# Patient Record
Sex: FEMALE | Race: WHITE | NOT HISPANIC OR LATINO | Employment: UNEMPLOYED | URBAN - METROPOLITAN AREA
[De-identification: names, ages, dates, MRNs, and addresses within clinical notes are randomized per-mention and may not be internally consistent; named-entity substitution may affect disease eponyms.]

---

## 2017-04-29 ENCOUNTER — HISTORICAL (OUTPATIENT)
Dept: LAB | Facility: HOSPITAL | Age: 72
End: 2017-04-29

## 2017-10-26 ENCOUNTER — HISTORICAL (OUTPATIENT)
Dept: LAB | Facility: HOSPITAL | Age: 72
End: 2017-10-26

## 2017-10-26 LAB
ALBUMIN SERPL-MCNC: 4 GM/DL (ref 3.4–5)
ALBUMIN/GLOB SERPL: 0.9 RATIO (ref 1.1–2)
ALP SERPL-CCNC: 59 UNIT/L (ref 46–116)
ALT SERPL-CCNC: 26 UNIT/L (ref 12–78)
AST SERPL-CCNC: 20 UNIT/L (ref 15–37)
BILIRUB SERPL-MCNC: 0.5 MG/DL (ref 0.2–1)
BILIRUBIN DIRECT+TOT PNL SERPL-MCNC: 0.16 MG/DL (ref 0–0.2)
BILIRUBIN DIRECT+TOT PNL SERPL-MCNC: 0.3 MG/DL (ref 0–0.8)
BUN SERPL-MCNC: 20.7 MG/DL (ref 7–18)
CALCIUM SERPL-MCNC: 9.4 MG/DL (ref 8.5–10.1)
CHLORIDE SERPL-SCNC: 105 MMOL/L (ref 98–107)
CHOLEST SERPL-MCNC: 173 MG/DL (ref 0–200)
CHOLEST/HDLC SERPL: 2.6 {RATIO} (ref 0–4)
CO2 SERPL-SCNC: 29.6 MMOL/L (ref 21–32)
CREAT SERPL-MCNC: 0.71 MG/DL (ref 0.6–1.3)
GLOBULIN SER-MCNC: 4.7 GM/DL (ref 2.4–3.5)
GLUCOSE SERPL-MCNC: 108 MG/DL (ref 74–106)
HDLC SERPL-MCNC: 66 MG/DL (ref 40–60)
LDLC SERPL CALC-MCNC: 94 MG/DL (ref 0–129)
POTASSIUM SERPL-SCNC: 4.6 MMOL/L (ref 3.5–5.1)
PROT SERPL-MCNC: 8.7 GM/DL (ref 6.4–8.2)
SODIUM SERPL-SCNC: 140 MMOL/L (ref 136–145)
TRIGL SERPL-MCNC: 66 MG/DL
VLDLC SERPL CALC-MCNC: 13 MG/DL

## 2017-11-08 ENCOUNTER — HISTORICAL (OUTPATIENT)
Dept: RADIOLOGY | Facility: HOSPITAL | Age: 72
End: 2017-11-08

## 2018-05-12 ENCOUNTER — HISTORICAL (OUTPATIENT)
Dept: LAB | Facility: HOSPITAL | Age: 73
End: 2018-05-12

## 2018-05-12 LAB
ALBUMIN SERPL-MCNC: 3.8 GM/DL (ref 3.4–5)
ALBUMIN/GLOB SERPL: 0.8 RATIO (ref 1.1–2)
ALP SERPL-CCNC: 57 UNIT/L (ref 46–116)
ALT SERPL-CCNC: 23 UNIT/L (ref 12–78)
AST SERPL-CCNC: 24 UNIT/L (ref 15–37)
BILIRUB SERPL-MCNC: 0.4 MG/DL (ref 0.2–1)
BILIRUBIN DIRECT+TOT PNL SERPL-MCNC: 0.13 MG/DL (ref 0–0.2)
BILIRUBIN DIRECT+TOT PNL SERPL-MCNC: 0.27 MG/DL (ref 0–0.8)
BUN SERPL-MCNC: 18.6 MG/DL (ref 7–18)
CALCIUM SERPL-MCNC: 9 MG/DL (ref 8.5–10.1)
CHLORIDE SERPL-SCNC: 104 MMOL/L (ref 98–107)
CHOLEST SERPL-MCNC: 172 MG/DL (ref 0–200)
CHOLEST/HDLC SERPL: 2.8 {RATIO} (ref 0–4)
CO2 SERPL-SCNC: 31.9 MMOL/L (ref 21–32)
CREAT SERPL-MCNC: 0.73 MG/DL (ref 0.6–1.3)
GLOBULIN SER-MCNC: 4.9 GM/DL (ref 2.4–3.5)
GLUCOSE SERPL-MCNC: 117 MG/DL (ref 74–106)
HDLC SERPL-MCNC: 62 MG/DL (ref 40–60)
LDLC SERPL CALC-MCNC: 96 MG/DL (ref 0–129)
POTASSIUM SERPL-SCNC: 4.4 MMOL/L (ref 3.5–5.1)
PROT SERPL-MCNC: 8.7 GM/DL (ref 6.4–8.2)
SODIUM SERPL-SCNC: 140 MMOL/L (ref 136–145)
TRIGL SERPL-MCNC: 71 MG/DL
VLDLC SERPL CALC-MCNC: 14 MG/DL

## 2018-05-28 ENCOUNTER — HISTORICAL (OUTPATIENT)
Dept: RADIOLOGY | Facility: HOSPITAL | Age: 73
End: 2018-05-28

## 2018-10-12 ENCOUNTER — HISTORICAL (OUTPATIENT)
Dept: ADMINISTRATIVE | Facility: HOSPITAL | Age: 73
End: 2018-10-12

## 2018-10-12 LAB — POC CREATININE: 0.6 MG/DL (ref 0.6–1.3)

## 2018-10-26 ENCOUNTER — HISTORICAL (OUTPATIENT)
Dept: RADIOLOGY | Facility: HOSPITAL | Age: 73
End: 2018-10-26

## 2019-04-06 ENCOUNTER — HISTORICAL (OUTPATIENT)
Dept: LAB | Facility: HOSPITAL | Age: 74
End: 2019-04-06

## 2019-04-06 LAB
ABS NEUT (OLG): 2.02 X10(3)/MCL (ref 2.1–9.2)
ALBUMIN SERPL-MCNC: 3.8 GM/DL (ref 3.4–5)
ALBUMIN/GLOB SERPL: 0.7 RATIO (ref 1.1–2)
ALP SERPL-CCNC: 57 UNIT/L (ref 46–116)
ALT SERPL-CCNC: 16 UNIT/L (ref 12–78)
AST SERPL-CCNC: 22 UNIT/L (ref 15–37)
BASOPHILS # BLD AUTO: 0 X10(3)/MCL (ref 0–0.2)
BASOPHILS NFR BLD AUTO: 1 %
BILIRUB SERPL-MCNC: 0.5 MG/DL (ref 0.2–1)
BILIRUBIN DIRECT+TOT PNL SERPL-MCNC: 0.1 MG/DL (ref 0–0.2)
BILIRUBIN DIRECT+TOT PNL SERPL-MCNC: 0.4 MG/DL (ref 0–0.8)
BUN SERPL-MCNC: 17.9 MG/DL (ref 7–18)
CALCIUM SERPL-MCNC: 9.6 MG/DL (ref 8.5–10.1)
CHLORIDE SERPL-SCNC: 104 MMOL/L (ref 98–107)
CHOLEST SERPL-MCNC: 180 MG/DL (ref 0–200)
CHOLEST/HDLC SERPL: 2.6 {RATIO} (ref 0–4)
CO2 SERPL-SCNC: 30.3 MMOL/L (ref 21–32)
CREAT SERPL-MCNC: 0.74 MG/DL (ref 0.6–1.3)
EOSINOPHIL # BLD AUTO: 0.1 X10(3)/MCL (ref 0–0.9)
EOSINOPHIL NFR BLD AUTO: 3 %
ERYTHROCYTE [DISTWIDTH] IN BLOOD BY AUTOMATED COUNT: 14.5 % (ref 11.5–17)
GLOBULIN SER-MCNC: 5.4 GM/DL (ref 2.4–3.5)
GLUCOSE SERPL-MCNC: 90 MG/DL (ref 74–106)
HCT VFR BLD AUTO: 40.8 % (ref 37–47)
HDLC SERPL-MCNC: 69 MG/DL (ref 40–60)
HGB BLD-MCNC: 13.7 GM/DL (ref 12–16)
LDLC SERPL CALC-MCNC: 99 MG/DL (ref 0–129)
LYMPHOCYTES # BLD AUTO: 2.2 X10(3)/MCL (ref 0.6–4.6)
LYMPHOCYTES NFR BLD AUTO: 46 %
MCH RBC QN AUTO: 30.7 PG (ref 27–31)
MCHC RBC AUTO-ENTMCNC: 33.6 GM/DL (ref 33–36)
MCV RBC AUTO: 91.5 FL (ref 80–94)
MONOCYTES # BLD AUTO: 0.3 X10(3)/MCL (ref 0.1–1.3)
MONOCYTES NFR BLD AUTO: 7 %
NEUTROPHILS # BLD AUTO: 2.02 X10(3)/MCL (ref 1.4–7.9)
NEUTROPHILS NFR BLD AUTO: 43 %
PLATELET # BLD AUTO: 172 X10(3)/MCL (ref 130–400)
PMV BLD AUTO: 12.5 FL (ref 9.4–12.4)
POTASSIUM SERPL-SCNC: 4.5 MMOL/L (ref 3.5–5.1)
PROT SERPL-MCNC: 9.2 GM/DL (ref 6.4–8.2)
RBC # BLD AUTO: 4.46 X10(6)/MCL (ref 4.2–5.4)
SODIUM SERPL-SCNC: 140 MMOL/L (ref 136–145)
TRIGL SERPL-MCNC: 61 MG/DL
VLDLC SERPL CALC-MCNC: 12 MG/DL
WBC # SPEC AUTO: 4.7 X10(3)/MCL (ref 4.5–11.5)

## 2019-08-01 ENCOUNTER — HISTORICAL (OUTPATIENT)
Dept: LAB | Facility: HOSPITAL | Age: 74
End: 2019-08-01

## 2019-08-01 LAB
BUN SERPL-MCNC: 18.3 MG/DL (ref 7–18)
CALCIUM SERPL-MCNC: 9.1 MG/DL (ref 8.5–10.1)
CHLORIDE SERPL-SCNC: 105 MMOL/L (ref 98–107)
CO2 SERPL-SCNC: 24.9 MMOL/L (ref 21–32)
CREAT SERPL-MCNC: 0.78 MG/DL (ref 0.6–1.3)
CREAT/UREA NIT SERPL: 23
GLUCOSE SERPL-MCNC: 84 MG/DL (ref 74–106)
POTASSIUM SERPL-SCNC: 3.8 MMOL/L (ref 3.5–5.1)
SODIUM SERPL-SCNC: 139 MMOL/L (ref 136–145)

## 2019-08-13 ENCOUNTER — HISTORICAL (OUTPATIENT)
Dept: RADIOLOGY | Facility: HOSPITAL | Age: 74
End: 2019-08-13

## 2019-10-29 ENCOUNTER — HISTORICAL (OUTPATIENT)
Dept: RADIOLOGY | Facility: HOSPITAL | Age: 74
End: 2019-10-29

## 2020-10-26 ENCOUNTER — HISTORICAL (OUTPATIENT)
Dept: LAB | Facility: HOSPITAL | Age: 75
End: 2020-10-26

## 2020-10-26 LAB
ALBUMIN SERPL-MCNC: 4.26 GM/DL (ref 3.4–5)
ALP SERPL-CCNC: 60 UNIT/L (ref 46–116)
ALT SERPL-CCNC: 18 UNIT/L (ref 12–78)
AST SERPL-CCNC: 22 UNIT/L (ref 15–37)
BILIRUB SERPL-MCNC: 0.4 MG/DL (ref 0.2–1)
BILIRUBIN DIRECT+TOT PNL SERPL-MCNC: 0.14 MG/DL (ref 0–0.2)
BILIRUBIN DIRECT+TOT PNL SERPL-MCNC: 0.26 MG/DL (ref 0–0.8)
CHOLEST SERPL-MCNC: 187 MG/DL (ref 0–200)
CHOLEST/HDLC SERPL: 2.6 {RATIO} (ref 0–4)
HDLC SERPL-MCNC: 73 MG/DL (ref 40–60)
LDLC SERPL CALC-MCNC: 99 MG/DL (ref 0–129)
PROT SERPL-MCNC: 9.6 GM/DL (ref 6.4–8.2)
TRIGL SERPL-MCNC: 73 MG/DL
VLDLC SERPL CALC-MCNC: 15 MG/DL

## 2020-12-14 ENCOUNTER — HISTORICAL (OUTPATIENT)
Dept: RADIOLOGY | Facility: HOSPITAL | Age: 75
End: 2020-12-14

## 2021-05-31 ENCOUNTER — HISTORICAL (OUTPATIENT)
Dept: LAB | Facility: HOSPITAL | Age: 76
End: 2021-05-31

## 2021-05-31 LAB
ABS NEUT (OLG): 2.43 X10(3)/MCL (ref 2.1–9.2)
ALBUMIN SERPL-MCNC: 3.9 GM/DL (ref 3.4–4.8)
ALBUMIN/GLOB SERPL: 0.8 RATIO (ref 1.1–2)
ALP SERPL-CCNC: 62 UNIT/L (ref 40–150)
ALT SERPL-CCNC: 12 UNIT/L (ref 0–55)
AST SERPL-CCNC: 18 UNIT/L (ref 5–34)
BASOPHILS # BLD AUTO: 0 X10(3)/MCL (ref 0–0.2)
BASOPHILS NFR BLD AUTO: 1 %
BILIRUB SERPL-MCNC: 0.6 MG/DL
BILIRUBIN DIRECT+TOT PNL SERPL-MCNC: 0.3 MG/DL (ref 0–0.5)
BILIRUBIN DIRECT+TOT PNL SERPL-MCNC: 0.3 MG/DL (ref 0–0.8)
BUN SERPL-MCNC: 15.4 MG/DL (ref 9.8–20.1)
CALCIUM SERPL-MCNC: 9.2 MG/DL (ref 8.4–10.2)
CHLORIDE SERPL-SCNC: 105 MMOL/L (ref 98–107)
CHOLEST SERPL-MCNC: 166 MG/DL
CHOLEST/HDLC SERPL: 3 {RATIO} (ref 0–5)
CO2 SERPL-SCNC: 26 MMOL/L (ref 23–31)
CREAT SERPL-MCNC: 0.74 MG/DL (ref 0.55–1.02)
DEPRECATED CALCIDIOL+CALCIFEROL SERPL-MC: 24.4 NG/ML (ref 30–80)
EOSINOPHIL # BLD AUTO: 0.2 X10(3)/MCL (ref 0–0.9)
EOSINOPHIL NFR BLD AUTO: 3 %
ERYTHROCYTE [DISTWIDTH] IN BLOOD BY AUTOMATED COUNT: 12.8 % (ref 11.5–17)
GLOBULIN SER-MCNC: 4.8 GM/DL (ref 2.4–3.5)
GLUCOSE SERPL-MCNC: 109 MG/DL (ref 82–115)
HCT VFR BLD AUTO: 40.3 % (ref 37–47)
HDLC SERPL-MCNC: 50 MG/DL (ref 35–60)
HGB BLD-MCNC: 13.2 GM/DL (ref 12–16)
IMM GRANULOCYTES # BLD AUTO: 0.01 % (ref 0–0.02)
IMM GRANULOCYTES NFR BLD AUTO: 0.2 % (ref 0–0.43)
LDLC SERPL CALC-MCNC: 98 MG/DL (ref 50–140)
LYMPHOCYTES # BLD AUTO: 2 X10(3)/MCL (ref 0.6–4.6)
LYMPHOCYTES NFR BLD AUTO: 40 %
MCH RBC QN AUTO: 32.1 PG (ref 27–31)
MCHC RBC AUTO-ENTMCNC: 32.8 GM/DL (ref 33–36)
MCV RBC AUTO: 98.1 FL (ref 80–94)
MONOCYTES # BLD AUTO: 0.3 X10(3)/MCL (ref 0.1–1.3)
MONOCYTES NFR BLD AUTO: 7 %
NEUTROPHILS # BLD AUTO: 2.43 X10(3)/MCL (ref 1.4–7.9)
NEUTROPHILS NFR BLD AUTO: 49 %
PLATELET # BLD AUTO: 165 X10(3)/MCL (ref 130–400)
PMV BLD AUTO: 11.3 FL (ref 9.4–12.4)
POTASSIUM SERPL-SCNC: 4.4 MMOL/L (ref 3.5–5.1)
PROT SERPL-MCNC: 8.7 GM/DL (ref 5.8–7.6)
RBC # BLD AUTO: 4.11 X10(6)/MCL (ref 4.2–5.4)
SODIUM SERPL-SCNC: 140 MMOL/L (ref 136–145)
TRIGL SERPL-MCNC: 90 MG/DL (ref 37–140)
TSH SERPL-ACNC: 2.25 UIU/ML (ref 0.35–4.94)
VLDLC SERPL CALC-MCNC: 18 MG/DL
WBC # SPEC AUTO: 4.9 X10(3)/MCL (ref 4.5–11.5)

## 2021-12-20 ENCOUNTER — HISTORICAL (OUTPATIENT)
Dept: RADIOLOGY | Facility: HOSPITAL | Age: 76
End: 2021-12-20

## 2021-12-20 LAB
ABS NEUT (OLG): 3.99 X10(3)/MCL (ref 2.1–9.2)
ALBUMIN SERPL-MCNC: 4.1 GM/DL (ref 3.4–4.8)
ALBUMIN/GLOB SERPL: 0.9 RATIO (ref 1.1–2)
ALP SERPL-CCNC: 66 UNIT/L (ref 40–150)
ALT SERPL-CCNC: 16 UNIT/L (ref 0–55)
AST SERPL-CCNC: 22 UNIT/L (ref 5–34)
BASOPHILS # BLD AUTO: 0 X10(3)/MCL (ref 0–0.2)
BASOPHILS NFR BLD AUTO: 0 %
BILIRUB SERPL-MCNC: 0.5 MG/DL
BILIRUBIN DIRECT+TOT PNL SERPL-MCNC: 0.2 MG/DL (ref 0–0.8)
BILIRUBIN DIRECT+TOT PNL SERPL-MCNC: 0.3 MG/DL (ref 0–0.5)
BUN SERPL-MCNC: 15.2 MG/DL (ref 9.8–20.1)
CALCIUM SERPL-MCNC: 9.4 MG/DL (ref 8.7–10.5)
CHLORIDE SERPL-SCNC: 105 MMOL/L (ref 98–107)
CHOLEST SERPL-MCNC: 173 MG/DL
CHOLEST/HDLC SERPL: 3 {RATIO} (ref 0–5)
CO2 SERPL-SCNC: 26 MMOL/L (ref 23–31)
CREAT SERPL-MCNC: 0.74 MG/DL (ref 0.55–1.02)
DEPRECATED CALCIDIOL+CALCIFEROL SERPL-MC: 76 NG/ML (ref 30–80)
EOSINOPHIL # BLD AUTO: 0.1 X10(3)/MCL (ref 0–0.9)
EOSINOPHIL NFR BLD AUTO: 2 %
ERYTHROCYTE [DISTWIDTH] IN BLOOD BY AUTOMATED COUNT: 13.5 % (ref 11.5–17)
GLOBULIN SER-MCNC: 4.8 GM/DL (ref 2.4–3.5)
GLUCOSE SERPL-MCNC: 117 MG/DL (ref 82–115)
HCT VFR BLD AUTO: 39.8 % (ref 37–47)
HDLC SERPL-MCNC: 60 MG/DL (ref 35–60)
HGB BLD-MCNC: 12.5 GM/DL (ref 12–16)
IMM GRANULOCYTES # BLD AUTO: 0.02 % (ref 0–0.02)
IMM GRANULOCYTES NFR BLD AUTO: 0.3 % (ref 0–0.43)
LDLC SERPL CALC-MCNC: 99 MG/DL (ref 50–140)
LYMPHOCYTES # BLD AUTO: 2.3 X10(3)/MCL (ref 0.6–4.6)
LYMPHOCYTES NFR BLD AUTO: 34 %
MCH RBC QN AUTO: 31.4 PG (ref 27–31)
MCHC RBC AUTO-ENTMCNC: 31.4 GM/DL (ref 33–36)
MCV RBC AUTO: 100 FL (ref 80–94)
MONOCYTES # BLD AUTO: 0.4 X10(3)/MCL (ref 0.1–1.3)
MONOCYTES NFR BLD AUTO: 6 %
NEUTROPHILS # BLD AUTO: 3.99 X10(3)/MCL (ref 1.4–7.9)
NEUTROPHILS NFR BLD AUTO: 58 %
PLATELET # BLD AUTO: 210 X10(3)/MCL (ref 130–400)
PMV BLD AUTO: 11.4 FL (ref 9.4–12.4)
POTASSIUM SERPL-SCNC: 4.3 MMOL/L (ref 3.5–5.1)
PROT SERPL-MCNC: 8.9 GM/DL (ref 5.8–7.6)
RBC # BLD AUTO: 3.98 X10(6)/MCL (ref 4.2–5.4)
SODIUM SERPL-SCNC: 139 MMOL/L (ref 136–145)
TRIGL SERPL-MCNC: 71 MG/DL (ref 37–140)
TSH SERPL-ACNC: 1.62 UIU/ML (ref 0.35–4.94)
VLDLC SERPL CALC-MCNC: 14 MG/DL
WBC # SPEC AUTO: 6.9 X10(3)/MCL (ref 4.5–11.5)

## 2022-06-27 ENCOUNTER — HOSPITAL ENCOUNTER (EMERGENCY)
Facility: HOSPITAL | Age: 77
Discharge: HOME | End: 2022-06-27
Attending: SPECIALIST
Payer: MEDICARE

## 2022-06-27 VITALS
OXYGEN SATURATION: 99 % | SYSTOLIC BLOOD PRESSURE: 155 MMHG | RESPIRATION RATE: 18 BRPM | HEART RATE: 66 BPM | DIASTOLIC BLOOD PRESSURE: 90 MMHG

## 2022-06-27 DIAGNOSIS — R10.13 EPIGASTRIC PAIN: ICD-10-CM

## 2022-06-27 DIAGNOSIS — R11.2 NAUSEA AND VOMITING, INTRACTABILITY OF VOMITING NOT SPECIFIED, UNSPECIFIED VOMITING TYPE: Primary | ICD-10-CM

## 2022-06-27 LAB
ALBUMIN SERPL-MCNC: 4.6 GM/DL (ref 3.4–4.8)
ALBUMIN/GLOB SERPL: 0.8 RATIO (ref 1.1–2)
ALP SERPL-CCNC: 65 UNIT/L (ref 40–150)
ALT SERPL-CCNC: 18 UNIT/L (ref 0–55)
AST SERPL-CCNC: 25 UNIT/L (ref 5–34)
BASOPHILS # BLD AUTO: 0.01 X10(3)/MCL (ref 0–0.2)
BASOPHILS NFR BLD AUTO: 0.2 %
BILIRUBIN DIRECT+TOT PNL SERPL-MCNC: 0.7 MG/DL
BUN SERPL-MCNC: 19.8 MG/DL (ref 9.8–20.1)
CALCIUM SERPL-MCNC: 10.2 MG/DL (ref 8.4–10.2)
CHLORIDE SERPL-SCNC: 103 MMOL/L (ref 98–107)
CO2 SERPL-SCNC: 25 MMOL/L (ref 23–31)
CREAT SERPL-MCNC: 1.03 MG/DL (ref 0.55–1.02)
EOSINOPHIL # BLD AUTO: 0 X10(3)/MCL (ref 0–0.9)
EOSINOPHIL NFR BLD AUTO: 0 %
ERYTHROCYTE [DISTWIDTH] IN BLOOD BY AUTOMATED COUNT: 13.7 % (ref 11.5–17)
GLOBULIN SER-MCNC: 6.1 GM/DL (ref 2.4–3.5)
GLUCOSE SERPL-MCNC: 132 MG/DL (ref 82–115)
HCT VFR BLD AUTO: 41.6 % (ref 37–47)
HGB BLD-MCNC: 13.1 GM/DL (ref 12–16)
IMM GRANULOCYTES # BLD AUTO: 0.02 X10(3)/MCL (ref 0–0.02)
IMM GRANULOCYTES NFR BLD AUTO: 0.5 % (ref 0–0.43)
LIPASE SERPL-CCNC: 29 U/L
LYMPHOCYTES # BLD AUTO: 1.2 X10(3)/MCL (ref 0.6–4.6)
LYMPHOCYTES NFR BLD AUTO: 28.6 %
MCH RBC QN AUTO: 30.8 PG (ref 27–31)
MCHC RBC AUTO-ENTMCNC: 31.5 MG/DL (ref 33–36)
MCV RBC AUTO: 97.7 FL (ref 80–94)
MONOCYTES # BLD AUTO: 0.09 X10(3)/MCL (ref 0.1–1.3)
MONOCYTES NFR BLD AUTO: 2.1 %
NEUTROPHILS # BLD AUTO: 2.9 X10(3)/MCL (ref 2.1–9.2)
NEUTROPHILS NFR BLD AUTO: 68.6 %
PLATELET # BLD AUTO: 208 X10(3)/MCL (ref 130–400)
PMV BLD AUTO: 10.9 FL (ref 9.4–12.4)
POTASSIUM SERPL-SCNC: 3.9 MMOL/L (ref 3.5–5.1)
PROT SERPL-MCNC: 10.7 GM/DL (ref 5.8–7.6)
RBC # BLD AUTO: 4.26 X10(6)/MCL (ref 4.2–5.4)
SODIUM SERPL-SCNC: 139 MMOL/L (ref 136–145)
WBC # SPEC AUTO: 4.2 X10(3)/MCL (ref 4.5–11.5)

## 2022-06-27 PROCEDURE — 80053 COMPREHEN METABOLIC PANEL: CPT | Performed by: SPECIALIST

## 2022-06-27 PROCEDURE — 99285 EMERGENCY DEPT VISIT HI MDM: CPT | Mod: 25

## 2022-06-27 PROCEDURE — 83690 ASSAY OF LIPASE: CPT | Performed by: SPECIALIST

## 2022-06-27 PROCEDURE — 96374 THER/PROPH/DIAG INJ IV PUSH: CPT

## 2022-06-27 PROCEDURE — 85025 COMPLETE CBC W/AUTO DIFF WBC: CPT | Performed by: SPECIALIST

## 2022-06-27 PROCEDURE — 36415 COLL VENOUS BLD VENIPUNCTURE: CPT | Performed by: SPECIALIST

## 2022-06-27 PROCEDURE — 63600175 PHARM REV CODE 636 W HCPCS: Performed by: SPECIALIST

## 2022-06-27 RX ORDER — ONDANSETRON 2 MG/ML
4 INJECTION INTRAMUSCULAR; INTRAVENOUS
Status: COMPLETED | OUTPATIENT
Start: 2022-06-27 | End: 2022-06-27

## 2022-06-27 RX ADMIN — ONDANSETRON 4 MG: 2 INJECTION INTRAMUSCULAR; INTRAVENOUS at 09:06

## 2022-06-28 NOTE — ED PROVIDER NOTES
"Encounter Date: 6/27/2022       History     Chief Complaint   Patient presents with    Emesis     Family states pt began throwing up and "threw her head back against the chair" pt is aaox3 and states that the smell of food got her nauseated and she vomited, she also admits to having mid epigastric pain x a few days.      Patient reports smelling some crab which made her nauseated and then she vomited; she remembers the entire event states she did not have a seizure that she knows of and is currently fully oriented x4 without complaints    The history is provided by the patient and a relative.     Review of patient's allergies indicates:  No Known Allergies  History reviewed. No pertinent past medical history.  History reviewed. No pertinent surgical history.  History reviewed. No pertinent family history.     Review of Systems   Constitutional: Negative.    HENT: Negative.    Respiratory: Negative.    Cardiovascular: Negative.    Gastrointestinal: Negative.    Musculoskeletal: Negative.    Skin: Negative.    Neurological: Negative.    All other systems reviewed and are negative.      Physical Exam     Initial Vitals [06/27/22 2045]   BP Pulse Resp Temp SpO2   (!) 155/90 66 18 -- 99 %      MAP       --         Physical Exam    Nursing note and vitals reviewed.  Constitutional: She appears well-developed and well-nourished.   HENT:   Head: Normocephalic and atraumatic.   Eyes: EOM are normal. Pupils are equal, round, and reactive to light.   Neck: Neck supple.   Normal range of motion.  Cardiovascular: Normal rate, regular rhythm, normal heart sounds and intact distal pulses.   Pulmonary/Chest: Breath sounds normal.   Abdominal: Abdomen is soft. Bowel sounds are normal.   Musculoskeletal:         General: Normal range of motion.      Cervical back: Normal range of motion and neck supple.     Neurological: She is alert and oriented to person, place, and time. She has normal strength.   Skin: Skin is warm and dry. "         ED Course   Procedures  Labs Reviewed   COMPREHENSIVE METABOLIC PANEL - Abnormal; Notable for the following components:       Result Value    Glucose Level 132 (*)     Creatinine 1.03 (*)     Protein Total 10.7 (*)     Globulin 6.1 (*)     Albumin/Globulin Ratio 0.8 (*)     All other components within normal limits   CBC WITH DIFFERENTIAL - Abnormal; Notable for the following components:    WBC 4.2 (*)     MCV 97.7 (*)     MCHC 31.5 (*)     Mono # 0.09 (*)     IG# 0.02 (*)     IG% 0.5 (*)     All other components within normal limits   LIPASE - Normal   CBC W/ AUTO DIFFERENTIAL    Narrative:     The following orders were created for panel order CBC auto differential.  Procedure                               Abnormality         Status                     ---------                               -----------         ------                     CBC with Differential[[id number]]        Abnormal            Final result                 Please view results for these tests on the individual orders.          Imaging Results          CT Abdomen Pelvis  Without Contrast (Preliminary result)  Result time 06/27/22 21:07:09    Preliminary result by Jose Antonio Hanson Jr., MD (06/27/22 21:07:09)                 Narrative:    START OF REPORT:  Technique: CT of the abdomen and pelvis was performed with axial images as well as sagittal and coronal reconstruction images without intravenous contrast.    Comparison: None available.    Clinical History: Epigastric discomfort and throwing up.    Dosage Information: Automated Exposure Control was utilized.    Findings:  Lines and Tubes: None.  Thorax:  Lungs: A 4 mm wide solid noncalcified pulmonary nodule is seen in the visualized lingula (series 4, image 6).  Pleura: No effusions or thickening are seen. No pneumothorax is seen.  Heart: The heart size is within normal limits. Mild coronary artery and aortic valve calcifications are seen.  Abdomen:  Abdominal Wall: There is a small  uncomplicated umbilical hernia which contains mesenteric fat.  Liver: The liver appears unremarkable.  Biliary System: No intrahepatic or extrahepatic biliary duct dilatation is seen.  Gallbladder: The gallbladder appears unremarkable.  Pancreas: The pancreas appears unremarkable.  Spleen: The spleen appears unremarkable.  Adrenals: The adrenal glands appear unremarkable.  Kidneys: The kidneys appear unremarkable with no stones cysts masses or hydronephrosis.  Aorta: There is pronounced calcification of the abdominal aorta and its branches.  IVC: Unremarkable.  Bowel:  Esophagus: The visualized distal esophagus appears unremarkable.  Stomach: Distended stomach is seen.  Duodenum: Unremarkable appearing duodenum.  Small Bowel: The small bowel appears unremarkable.  Colon: There is moderate stool throughout the colon which could reflect an element of constipation. Colonic diverticulosis of the sigmoid and descending colon is noted, without evidence of pericolic inflammatory changes to suggest acute diverticulitis.  Appendix: The appendix is not identified but no inflammatory changes are seen in the right lower quadrant to suggest appendicitis.  Peritoneum: No intraperitoneal free air or ascites is seen.    Pelvis:  Bladder: The bladder is nondistended and cannot be definitively evaluated.  Female:  Uterus: The uterus is not identified.  Ovaries: No adnexal masses are seen.    Bony structures:  Dorsal Spine: There is mild multilevel spondylosis of the visualized dorsal spine.  Bony Pelvis: There is mild degenerative change of the bilateral hips.      Impression:  1. Colonic diverticulosis of the sigmoid and descending colon is noted, without evidence of pericolic inflammatory changes to suggest acute diverticulitis.  2. No acute intraabdominal or pelvic solid organ or bowel pathology identified. Details and findings as discussed.                                   Medications   ondansetron injection 4 mg (4 mg  Intravenous Given 6/27/22 2147)                         Patient Vitals for the past 24 hrs:   BP Pulse Resp SpO2   06/27/22 2045 (!) 155/90 66 18 99 %   Progressing as expected, asymptomatic      Clinical Impression:   Final diagnoses:  [R11.2] Nausea and vomiting, intractability of vomiting not specified, unspecified vomiting type (Primary)  [R10.13] Epigastric pain          ED Disposition Condition    Discharge Stable        ED Prescriptions     None        Follow-up Information     Follow up With Specialties Details Why Contact Info    [facility] - Emergency Dept Emergency Medicine  As needed [address] [id number] [phone]           Gerry Cummins MD  06/28/22 0119     wife

## 2022-11-01 DIAGNOSIS — Z12.31 ENCOUNTER FOR SCREENING MAMMOGRAM FOR BREAST CANCER: Primary | ICD-10-CM

## 2022-12-05 ENCOUNTER — LAB VISIT (OUTPATIENT)
Dept: LAB | Facility: HOSPITAL | Age: 77
End: 2022-12-05
Attending: NURSE PRACTITIONER
Payer: MEDICARE

## 2022-12-05 DIAGNOSIS — I10 ESSENTIAL HYPERTENSION, MALIGNANT: Primary | ICD-10-CM

## 2022-12-05 LAB
ALBUMIN SERPL-MCNC: 3.8 GM/DL (ref 3.4–4.8)
ALP SERPL-CCNC: 55 UNIT/L (ref 40–150)
ALT SERPL-CCNC: 25 UNIT/L (ref 0–55)
ANION GAP SERPL CALC-SCNC: 8 MEQ/L
AST SERPL-CCNC: 25 UNIT/L (ref 5–34)
BILIRUBIN DIRECT+TOT PNL SERPL-MCNC: 0.1 MG/DL (ref 0–0.8)
BILIRUBIN DIRECT+TOT PNL SERPL-MCNC: 0.2 MG/DL (ref 0–0.5)
BILIRUBIN DIRECT+TOT PNL SERPL-MCNC: 0.3 MG/DL
BUN SERPL-MCNC: 19 MG/DL (ref 9.8–20.1)
CALCIUM SERPL-MCNC: 9.7 MG/DL (ref 8.4–10.2)
CHLORIDE SERPL-SCNC: 105 MMOL/L (ref 98–107)
CHOLEST SERPL-MCNC: 176 MG/DL
CHOLEST/HDLC SERPL: 3 {RATIO} (ref 0–5)
CO2 SERPL-SCNC: 25 MMOL/L (ref 23–31)
CREAT SERPL-MCNC: 0.78 MG/DL (ref 0.55–1.02)
CREAT/UREA NIT SERPL: 24
GFR SERPLBLD CREATININE-BSD FMLA CKD-EPI: >60 MLS/MIN/1.73/M2
GLUCOSE SERPL-MCNC: 116 MG/DL (ref 82–115)
HDLC SERPL-MCNC: 57 MG/DL (ref 35–60)
LDLC SERPL CALC-MCNC: 102 MG/DL (ref 50–140)
POTASSIUM SERPL-SCNC: 5.2 MMOL/L (ref 3.5–5.1)
PROT SERPL-MCNC: 10.7 GM/DL (ref 5.8–7.6)
SODIUM SERPL-SCNC: 138 MMOL/L (ref 136–145)
TRIGL SERPL-MCNC: 87 MG/DL (ref 37–140)
VLDLC SERPL CALC-MCNC: 17 MG/DL

## 2022-12-05 PROCEDURE — 36415 COLL VENOUS BLD VENIPUNCTURE: CPT

## 2022-12-05 PROCEDURE — 80076 HEPATIC FUNCTION PANEL: CPT

## 2022-12-05 PROCEDURE — 80048 BASIC METABOLIC PNL TOTAL CA: CPT

## 2022-12-05 PROCEDURE — 80061 LIPID PANEL: CPT

## 2022-12-27 ENCOUNTER — HOSPITAL ENCOUNTER (OUTPATIENT)
Dept: RADIOLOGY | Facility: HOSPITAL | Age: 77
Discharge: HOME | End: 2022-12-27
Attending: NURSE PRACTITIONER
Payer: MEDICARE

## 2022-12-27 DIAGNOSIS — Z12.31 ENCOUNTER FOR SCREENING MAMMOGRAM FOR BREAST CANCER: ICD-10-CM

## 2022-12-27 PROCEDURE — 77067 MAMMO DIGITAL SCREENING BILAT WITH TOMO: ICD-10-PCS | Mod: 26,,, | Performed by: RADIOLOGY

## 2022-12-27 PROCEDURE — 77067 SCR MAMMO BI INCL CAD: CPT | Mod: TC

## 2022-12-27 PROCEDURE — 77063 MAMMO DIGITAL SCREENING BILAT WITH TOMO: ICD-10-PCS | Mod: 26,,, | Performed by: RADIOLOGY

## 2022-12-27 PROCEDURE — 77063 BREAST TOMOSYNTHESIS BI: CPT | Mod: TC

## 2022-12-27 PROCEDURE — 77067 SCR MAMMO BI INCL CAD: CPT | Mod: 26,,, | Performed by: RADIOLOGY

## 2022-12-27 PROCEDURE — 77063 BREAST TOMOSYNTHESIS BI: CPT | Mod: 26,,, | Performed by: RADIOLOGY

## 2023-06-28 DIAGNOSIS — Z13.820 ENCOUNTER FOR OSTEOPOROSIS SCREENING IN ASYMPTOMATIC POSTMENOPAUSAL PATIENT: Primary | ICD-10-CM

## 2023-06-28 DIAGNOSIS — Z78.0 ENCOUNTER FOR OSTEOPOROSIS SCREENING IN ASYMPTOMATIC POSTMENOPAUSAL PATIENT: Primary | ICD-10-CM

## 2023-07-10 ENCOUNTER — HOSPITAL ENCOUNTER (OUTPATIENT)
Dept: RADIOLOGY | Facility: HOSPITAL | Age: 78
Discharge: HOME | End: 2023-07-10
Attending: NURSE PRACTITIONER
Payer: MEDICARE

## 2023-07-10 DIAGNOSIS — Z78.0 ENCOUNTER FOR OSTEOPOROSIS SCREENING IN ASYMPTOMATIC POSTMENOPAUSAL PATIENT: ICD-10-CM

## 2023-07-10 DIAGNOSIS — Z13.820 ENCOUNTER FOR OSTEOPOROSIS SCREENING IN ASYMPTOMATIC POSTMENOPAUSAL PATIENT: ICD-10-CM

## 2023-07-10 PROCEDURE — 77080 DXA BONE DENSITY AXIAL: CPT | Mod: TC

## 2023-11-29 DIAGNOSIS — Z12.31 BREAST CANCER SCREENING BY MAMMOGRAM: Primary | ICD-10-CM

## 2023-12-29 ENCOUNTER — HOSPITAL ENCOUNTER (OUTPATIENT)
Dept: RADIOLOGY | Facility: HOSPITAL | Age: 78
Discharge: HOME | End: 2023-12-29
Attending: NURSE PRACTITIONER
Payer: MEDICARE

## 2023-12-29 DIAGNOSIS — Z12.31 BREAST CANCER SCREENING BY MAMMOGRAM: ICD-10-CM

## 2023-12-29 PROCEDURE — 77063 BREAST TOMOSYNTHESIS BI: CPT | Mod: 26,,, | Performed by: RADIOLOGY

## 2023-12-29 PROCEDURE — 77063 MAMMO DIGITAL SCREENING BILAT WITH TOMO: ICD-10-PCS | Mod: 26,,, | Performed by: RADIOLOGY

## 2023-12-29 PROCEDURE — 77067 SCR MAMMO BI INCL CAD: CPT | Mod: TC

## 2023-12-29 PROCEDURE — 77067 SCR MAMMO BI INCL CAD: CPT | Mod: 26,,, | Performed by: RADIOLOGY

## 2023-12-29 PROCEDURE — 77067 MAMMO DIGITAL SCREENING BILAT WITH TOMO: ICD-10-PCS | Mod: 26,,, | Performed by: RADIOLOGY

## 2024-01-03 DIAGNOSIS — R41.3 MEMORY LOSS: Primary | ICD-10-CM

## 2024-04-23 ENCOUNTER — OFFICE VISIT (OUTPATIENT)
Dept: NEUROLOGY | Facility: CLINIC | Age: 79
End: 2024-04-23
Payer: MEDICARE

## 2024-04-23 DIAGNOSIS — R41.9 COGNITIVE COMPLAINTS: Primary | ICD-10-CM

## 2024-04-23 DIAGNOSIS — R41.3 MEMORY LOSS: ICD-10-CM

## 2024-04-23 DIAGNOSIS — G31.84 MCI (MILD COGNITIVE IMPAIRMENT) WITH MEMORY LOSS: ICD-10-CM

## 2024-04-23 PROCEDURE — 99213 OFFICE O/P EST LOW 20 MIN: CPT | Mod: PBBFAC | Performed by: NURSE PRACTITIONER

## 2024-04-23 PROCEDURE — 99999 PR PBB SHADOW E&M-EST. PATIENT-LVL III: CPT | Mod: PBBFAC,,, | Performed by: NURSE PRACTITIONER

## 2024-04-23 PROCEDURE — 99205 OFFICE O/P NEW HI 60 MIN: CPT | Mod: S$PBB,,, | Performed by: NURSE PRACTITIONER

## 2024-04-23 RX ORDER — ATORVASTATIN CALCIUM 80 MG/1
80 TABLET, FILM COATED ORAL NIGHTLY
COMMUNITY
Start: 2024-02-23

## 2024-04-23 RX ORDER — ASCORBIC ACID 500 MG
1 TABLET ORAL EVERY MORNING
COMMUNITY

## 2024-04-23 RX ORDER — AMLODIPINE BESYLATE 10 MG/1
10 TABLET ORAL EVERY MORNING
COMMUNITY

## 2024-04-23 RX ORDER — CHOLECALCIFEROL (VITAMIN D3) 125 MCG
1 CAPSULE ORAL EVERY MORNING
COMMUNITY

## 2024-04-23 RX ORDER — ASPIRIN 81 MG/1
1 TABLET ORAL EVERY MORNING
COMMUNITY

## 2024-04-23 RX ORDER — ZINC GLUCONATE 50 MG
1 TABLET ORAL EVERY MORNING
COMMUNITY

## 2024-04-23 RX ORDER — PROCHLORPERAZINE MALEATE 5 MG
5 TABLET ORAL EVERY 6 HOURS PRN
COMMUNITY

## 2024-04-23 RX ORDER — DEXAMETHASONE 4 MG/1
TABLET ORAL
COMMUNITY

## 2024-04-23 RX ORDER — SPIRONOLACTONE 25 MG/1
25 TABLET ORAL
COMMUNITY

## 2024-04-23 RX ORDER — BUSPIRONE HYDROCHLORIDE 5 MG/1
5 TABLET ORAL 2 TIMES DAILY
COMMUNITY

## 2024-04-23 RX ORDER — LENALIDOMIDE 25 MG/1
25 CAPSULE ORAL
COMMUNITY
Start: 2024-03-04

## 2024-04-23 NOTE — PROGRESS NOTES
Neurology Note - Initial Encounter    Subjective:         Patient ID: Amalia Rubalcava is a 78 y.o. female.    Chief Complaint: NP ref by Raul LEMOS for neuro cons to [facility] for Memory.     HPI:            Decline in memory for abt 2-3 yrs and it continues to decline.       Forgets names, places, dates, numbers and conversations.   Repeats herself.     CT head done in 2020 - No acute intracranial normality with findings consistent  microangiopathy.    She lives alone  She manages all ADL's  She cooks  Mood is good  Appetite is ok  Sleeping well most nights     She drives w/o incidence    ROS: as per HPI, otherwise pertinent systems review is negative          Past Medical History:   Diagnosis Date    Hypertension     Mixed hyperlipidemia     Multiple myeloma        Past Surgical History:   Procedure Laterality Date    BLADDER SUSPENSION      HYSTERECTOMY         Family History   Problem Relation Name Age of Onset    Dementia Sister         Social History     Socioeconomic History    Marital status:    Tobacco Use    Smoking status: Former     Types: Cigarettes    Smokeless tobacco: Never   Substance and Sexual Activity    Alcohol use: Never    Drug use: Never       Review of patient's allergies indicates:  No Known Allergies    Current Outpatient Medications   Medication Instructions    amLODIPine (NORVASC) 10 mg, Oral, Every morning    ascorbic acid, vitamin C, (VITAMIN C) 500 MG tablet 1 tablet, Oral, Every morning    aspirin (ECOTRIN) 81 MG EC tablet 1 tablet, Oral, Every morning    atorvastatin (LIPITOR) 80 mg, Oral, Nightly    busPIRone (BUSPAR) 5 mg, Oral, 2 times daily    cholecalciferol, vitamin D3, 125 mcg (5,000 unit) capsule 1 capsule, Oral, Every morning    dexAMETHasone (DECADRON) 4 MG Tab SMARTSIG:10 Tablet(s) By Mouth Once a Week    dexbrompheniramine-phenylep-DM 2-10-20 mg Tab 1 tablet, Oral, Every 6 hours PRN    lenalidomide 25 mg, Oral    prochlorperazine (COMPAZINE) 5 mg, Oral, Every 6 hours  PRN    spironolactone (ALDACTONE) 25 mg, Oral    zinc gluconate 50 mg tablet 1 tablet, Oral, Every morning         Objective:      Exam:   There were no vitals taken for this visit.    Physical Exam  Vitals reviewed.   Constitutional:       Appearance: Normal appearance.      Accompanied by: Pts daughter (Arleen) & DIL (Ina) are here today.   HENT:      Ears:      Comments: Hearing normal.     Mallampati: 2  Eyes:      Extraocular Movements: Extraocular movements intact.      VF's ok     PERRLA  Cardiovascular:      Rate and Rhythm: Normal rate and regular rhythm.   Pulmonary:      Effort: Pulmonary effort is normal.      Breath sounds: Normal breath sounds.   Musculoskeletal:         General: Normal range of motion.   Skin:     General: Skin is warm and dry.   Neurological:      General: No focal deficit present.      Mental Status: alert; cog impairment; MMSE 21/30     Speech:nml     Face: symmetric; tongue midline; cheek puff nml     Motor: nonlateralizing     Coordination: F to N ok, no dysmetria     Reflexes: symmetric     Sensation: Vib nml     Tone: No ankle clonus     Tremor: none     Gait: unassisted and normal. Tip toe/heel/tandem ok  Psychiatric:         Mood and Affect: Mood normal.         Behavior: Behavior normal.         Assessment/Plan:     Problem List Items Addressed This Visit          Neuro    Memory loss    Cognitive complaints - Primary    MCI (mild cognitive impairment) with memory loss    May represent neurodegeneration process but most likely consistent with natural aging process.     Discussion surrounding tests such as CSF analysis of molecular biomarkers of A? protein deposition including A?42 and CSF total tau and phospho-tau; amyloid PET imaging also discussed.    Medications discussed, including Memantine and Donepezil; collectively decided to hold off for now.    Driving discussed    Follow up Dec 2024               Mark Scherer, MSN, APRN, AGACNP-BC

## 2024-04-24 ENCOUNTER — LAB VISIT (OUTPATIENT)
Dept: LAB | Facility: HOSPITAL | Age: 79
End: 2024-04-24
Attending: NURSE PRACTITIONER
Payer: MEDICARE

## 2024-04-24 DIAGNOSIS — R41.3 MEMORY LOSS: ICD-10-CM

## 2024-04-24 DIAGNOSIS — R41.9 COGNITIVE COMPLAINTS: ICD-10-CM

## 2024-04-24 LAB
APPEARANCE UR: CLEAR
BACTERIA #/AREA URNS AUTO: NORMAL /HPF
BILIRUB UR QL STRIP.AUTO: NEGATIVE
COLOR UR AUTO: YELLOW
GLUCOSE UR QL STRIP.AUTO: NEGATIVE
KETONES UR QL STRIP.AUTO: NEGATIVE
LEUKOCYTE ESTERASE UR QL STRIP.AUTO: NEGATIVE
NITRITE UR QL STRIP.AUTO: NEGATIVE
PH UR STRIP.AUTO: 6 [PH]
PROT UR QL STRIP.AUTO: NEGATIVE
RBC #/AREA URNS AUTO: NORMAL /HPF
RBC UR QL AUTO: NEGATIVE
SP GR UR STRIP.AUTO: 1.01 (ref 1–1.03)
SQUAMOUS #/AREA URNS AUTO: NORMAL /HPF
UROBILINOGEN UR STRIP-ACNC: 0.2
WBC #/AREA URNS AUTO: NORMAL /HPF

## 2024-04-24 PROCEDURE — 81003 URINALYSIS AUTO W/O SCOPE: CPT

## 2024-12-11 DIAGNOSIS — Z12.31 BREAST CANCER SCREENING BY MAMMOGRAM: Primary | ICD-10-CM

## 2025-01-03 ENCOUNTER — HOSPITAL ENCOUNTER (OUTPATIENT)
Dept: RADIOLOGY | Facility: HOSPITAL | Age: 80
Discharge: HOME | End: 2025-01-03
Attending: NURSE PRACTITIONER
Payer: MEDICARE

## 2025-01-03 DIAGNOSIS — Z12.31 BREAST CANCER SCREENING BY MAMMOGRAM: ICD-10-CM

## 2025-01-03 PROCEDURE — 77063 BREAST TOMOSYNTHESIS BI: CPT | Mod: TC

## 2025-01-03 PROCEDURE — 77063 BREAST TOMOSYNTHESIS BI: CPT | Mod: 26,,, | Performed by: RADIOLOGY

## 2025-01-03 PROCEDURE — 77067 SCR MAMMO BI INCL CAD: CPT | Mod: 26,,, | Performed by: RADIOLOGY

## 2025-01-08 ENCOUNTER — HOSPITAL ENCOUNTER (EMERGENCY)
Facility: HOSPITAL | Age: 80
Discharge: HOME | End: 2025-01-08
Attending: EMERGENCY MEDICINE
Payer: MEDICARE

## 2025-01-08 VITALS
WEIGHT: 109 LBS | DIASTOLIC BLOOD PRESSURE: 67 MMHG | TEMPERATURE: 98 F | RESPIRATION RATE: 17 BRPM | HEART RATE: 82 BPM | BODY MASS INDEX: 19.31 KG/M2 | OXYGEN SATURATION: 98 % | SYSTOLIC BLOOD PRESSURE: 155 MMHG | HEIGHT: 63 IN

## 2025-01-08 DIAGNOSIS — C90.00 MULTIPLE MYELOMA: ICD-10-CM

## 2025-01-08 DIAGNOSIS — J34.9 PARANASAL SINUS DISEASE: Primary | ICD-10-CM

## 2025-01-08 DIAGNOSIS — M21.371 FOOT DROP, RIGHT: ICD-10-CM

## 2025-01-08 PROCEDURE — 99284 EMERGENCY DEPT VISIT MOD MDM: CPT | Mod: 25

## 2025-01-08 RX ORDER — AZITHROMYCIN 500 MG/1
500 TABLET, FILM COATED ORAL DAILY
Qty: 7 TABLET | Refills: 0 | Status: SHIPPED | OUTPATIENT
Start: 2025-01-08 | End: 2025-01-15

## 2025-01-08 NOTE — DISCHARGE INSTRUCTIONS
Keep neurologist as appointment next week as scheduled.    IMPORTANT: EVEN THOUGH WE THINK IT IS SAFE FOR YOU TO GO HOME, NO EVALUATION OR TEST IS PERFECT SO THERE IS ALWAYS A SMALL CHANCE THAT YOU WILL NEED TO RETURN AND BE HOSPITALIZED.  THEREFORE IT IS VERY IMPORTANT THAT IF YOU GET WORSE OR DEVELOP ANY NEW SYMPTOMS THAT YOU RETURN HERE AS SOON AS POSSIBLE TO BE RE-EVALUATED.  THIS INCLUDES THE RETURN OF SYMPTOMS THAT HAVE RESOLVED SUCH AS FAINTING, CHEST PAIN OR SYMPTOMS THAT COULD BE A WARNING FOR A STROKE.     RETURN PRECAUTIONS:         Return here or see/call your doctor if not improving as expected, but return here immediately and/or contact a primary care doctor if you get worse, develop any new symptoms or have any of the following:  - Weakness or changes in speech/vision/coordination   - Shortness of breath, chest pain or fainting  -  One or both legs/feet become swollen, cold or painful  - Vomiting, dark stool, bleeding or trouble urinating  - Fever or chills, or if already present fever >103 or lasting >4-5 days  - New, changing or migrating pain     FOLLOW-UP CARE: We often dont make a definite diagnosis in the ER.  You will need a second opinion if you do not get better and usually even if you do.  Call your doctor and/or any doctors we referred you to for more advice and to make an appointment.  Do this today, tomorrow or after the weekend.  If you have HMO insurance, you may want to contact your HMO or your primary care doctor for referral to a specialist within your plan.  Either way tell the doctors office that it was a referral from the emergency department to get the soonest possible appointment.        YOUR TEST RESULTS: Take result reports of any blood or urine tests, imaging tests and EKGs to your doctor and any referral doctors. Have any abnormal tests repeated.  Your doctor or a referral doctor can let you know when this should be done.  Also make sure your doctor contacts this hospital  to get any test results that are not yet available such as culture results or special tests for infection and final imaging reports, which are often not available at the time you leave the ER but which may list additional important findings that are not on the preliminary report.        BLOOD PRESSURE & GLUCOSE: If your blood pressure was greater than 120/80 or your glucose level was >100 have it rechecked within 1 to 2 weeks.  The stress of your current condition can often cause a temporary rise in blood pressure or glucose level, but many people have undiagnosed hypertension or pre-diabetes.        MEDICATION SIDE EFFECTS: Do not drive, walk, bike, take the bus, etc. if you have received or are being prescribed any sedating medications such as those for pain or anxiety or certain  antihistamines like Benadryl.  If you have been give one of these here, get a taxi home or have a friend drive you home.  Ask your pharmacist to  you on potential side effects of any new medication.  If you are being prescribed antibiotics take a probiotic or eat yogurt and realize birth control pills may be less effective.        WHAT IF I DONT GET BETTER: Even with the best laid treatment plan, there is typically a 5-10% chance that you will not improve as expected or even get worse.  If you get worse you should return here as soon as possible; the wait is typically shortest in the morning.  If you stay the same or only improve partially, instead of returning here it may be reasonable to see a primary care provider for follow-up care and a second opinion.

## 2025-01-10 NOTE — ED PROVIDER NOTES
Encounter Date: 1/8/2025       History     Chief Complaint   Patient presents with    R Foot Dragging      Pt reports 1 week ago she noticed her right foot dragging;reports a hx of multiple myeloma. Pt also reports she hit her head on the door around the same time - healing bruise noted to right eyebrow; -LOC. Pt denies being on thinners.       Pt reports 1 week ago she noticed her right foot dragging;reports a hx of multiple myeloma. Pt also reports she hit her head on the door around the same time - healing bruise noted to right eyebrow; -LOC. Pt denies being on thinners anticoagulants        Review of patient's allergies indicates:  No Known Allergies  Past Medical History:   Diagnosis Date    Hypertension     Mixed hyperlipidemia     Multiple myeloma      Past Surgical History:   Procedure Laterality Date    BLADDER SUSPENSION      HYSTERECTOMY       Family History   Problem Relation Name Age of Onset    Dementia Sister       Social History     Tobacco Use    Smoking status: Former     Types: Cigarettes    Smokeless tobacco: Never   Substance Use Topics    Alcohol use: Never    Drug use: Never     Review of Systems   Constitutional:  Negative for chills and fever.   HENT: Negative.  Negative for congestion, sore throat and trouble swallowing.    Eyes:  Negative for discharge and visual disturbance.   Respiratory:  Negative for cough and shortness of breath.    Cardiovascular:  Negative for chest pain and palpitations.   Gastrointestinal:  Negative for abdominal pain, diarrhea and vomiting.   Endocrine: Negative.    Genitourinary:  Negative for flank pain and hematuria.   Musculoskeletal:  Positive for gait problem. Negative for myalgias.   Skin:  Negative for rash.   Neurological:  Negative for dizziness and headaches.   Psychiatric/Behavioral:  Negative for hallucinations and suicidal ideas.    All other systems reviewed and are negative.      Physical Exam     Initial Vitals [01/08/25 1120]   BP Pulse Resp  Temp SpO2   (!) 155/67 82 17 97.9 °F (36.6 °C) 98 %      MAP       --         Physical Exam    Nursing note and vitals reviewed.  Constitutional: She appears well-developed and well-nourished. No distress.   HENT:   Head: Normocephalic and atraumatic.       Eyes: EOM are normal. Pupils are equal, round, and reactive to light.   Neck: Trachea normal. Neck supple.    Full passive range of motion without pain.     Cardiovascular:  Normal rate, regular rhythm and normal pulses.           Musculoskeletal:      Cervical back: Full passive range of motion without pain and neck supple.      Comments: No deformity, Nl ROM     Lymphadenopathy:     She has no cervical adenopathy.   Neurological: She is alert and oriented to person, place, and time. No cranial nerve deficit or sensory deficit. She exhibits abnormal muscle tone. GCS eye subscore is 4. GCS verbal subscore is 5. GCS motor subscore is 6.   There is a deficit in dorsiflexion of the right foot and dorsiflexion of the right great toe.  Strength is 4/5   Skin: Skin is warm and intact. Capillary refill takes less than 2 seconds.   Psychiatric: She is not actively hallucinating. She expresses no homicidal and no suicidal ideation.         ED Course   Procedures  Labs Reviewed - No data to display       Imaging Results              CT Head Without Contrast (Final result)  Result time 01/08/25 12:18:38      Final result by Luis Arenas MD (01/08/25 12:18:38)                   Impression:      No acute intracranial findings.      Electronically signed by: Luis Arenas  Date:    01/08/2025  Time:    12:18               Narrative:    EXAMINATION:  CT HEAD WITHOUT CONTRAST    CLINICAL HISTORY:  Facial trauma, blunt;    TECHNIQUE:  CT imaging of the head performed from the skull base to the vertex without intravenous contrast.  DLP 1105 mGycm. Automatic exposure control, adjustment of mA/kV or iterative reconstruction technique was used to reduce  radiation.    COMPARISON:  22 August 2020    FINDINGS:  There is no acute cortical infarct, hemorrhage or mass lesion.  There is perhaps slightly increased patchy hypoattenuation in the cerebral white matter which is nonspecific but most commonly associated with chronic small vessel ischemic changes.  The the ventricles are not significantly enlarged.  There are vascular calcifications.    There is paranasal sinus inflammation.  Mastoid air cells are clear.                                       X-Ray Tibia Fibula 2 View Right (Final result)  Result time 01/08/25 12:24:52      Final result by Jacques Patel MD (01/08/25 12:24:52)                   Impression:      No acute osseous process appreciated.      Electronically signed by: Jacques Patel  Date:    01/08/2025  Time:    12:24               Narrative:    EXAMINATION:  XR TIBIA FIBULA 2 VIEW RIGHT    CLINICAL HISTORY:  Multiple myeloma not having achieved remission    TECHNIQUE:  AP and lateral views of the right tibia and fibula.    COMPARISON:  None.    FINDINGS:  No acute fracture identified.  Aligned knee and ankle.  No defined suspicious lytic lesion.                                       Medications - No data to display  Medical Decision Making  The patient presented with a complaint of having been in a minor head trauma. The patient is now resting comfortably and feels better, alert, and in no distress. The patient has a normal mental status and is neurologically intact.  GCS is 15.      The history, exam, diagnostic testing (if any), and current condition do not demonstrate signs of clinically significant intracranial injury.  Furthermore, there is no evidence of serious secondary traumatic injury of intra-thoracic, intra-abdominal, or musculoskeletal etiology. The vital signs have been stable.     The patient's condition is stable and appropriate for discharge. The patient will pursue further outpatient evaluation with the primary care physician or  other designated or consulting physician as indicated in the discharge instructions      Amount and/or Complexity of Data Reviewed  Radiology: ordered.    Risk  Prescription drug management.               ED Course as of 01/10/25 1617   Wed Jan 08, 2025   1345 I reviewed the imaging studies ordered and made an interpretation of the studies prior to the official reading by the radiology service being available.  I tailored emergency care based on my preliminary findings.  I have now reviewed the official radiologist's reports and I agree with the asssessment of the radiologist.    She is experiencing a right foot drop.  We do not know the etiology of the neurapraxia currently.  No changes are see on CT.  I will refer her to neurology where she can be worked up with conduction studies, EMG, etc.  She declines splinting of the affected extremity. [DB]      ED Course User Index  [DB] Dominick Mason MD                           Clinical Impression:  Final diagnoses:  [C90.00] Multiple myeloma  [J34.9] Paranasal sinus disease (Primary)  [M21.371] Foot drop, right          ED Disposition Condition    Discharge Stable          ED Prescriptions       Medication Sig Dispense Start Date End Date Auth. Provider    azithromycin (ZITHROMAX) 500 MG tablet Take 1 tablet (500 mg total) by mouth once daily. for 7 days 7 tablet 1/8/2025 1/15/2025 Dominick Mason MD          Follow-up Information       Follow up With Specialties Details Why Contact Info    [facility], [facility] Schedule an appointment as soon as possible for a visit  For re-evaluation [address] [place] [id number]  [phone]               Dominick Mason MD  01/10/25 1617

## 2025-01-14 ENCOUNTER — OFFICE VISIT (OUTPATIENT)
Dept: NEUROLOGY | Facility: CLINIC | Age: 80
End: 2025-01-14
Payer: MEDICARE

## 2025-01-14 VITALS
BODY MASS INDEX: 18.61 KG/M2 | SYSTOLIC BLOOD PRESSURE: 110 MMHG | HEIGHT: 63 IN | DIASTOLIC BLOOD PRESSURE: 82 MMHG | WEIGHT: 105 LBS

## 2025-01-14 DIAGNOSIS — M21.371 FOOT DROP, RIGHT FOOT: ICD-10-CM

## 2025-01-14 DIAGNOSIS — F03.90 DEMENTIA WITHOUT BEHAVIORAL DISTURBANCE, PSYCHOTIC DISTURBANCE, MOOD DISTURBANCE, OR ANXIETY, UNSPECIFIED DEMENTIA SEVERITY, UNSPECIFIED DEMENTIA TYPE: Primary | ICD-10-CM

## 2025-01-14 PROCEDURE — 99215 OFFICE O/P EST HI 40 MIN: CPT | Mod: S$PBB,,, | Performed by: NURSE PRACTITIONER

## 2025-01-14 PROCEDURE — 99213 OFFICE O/P EST LOW 20 MIN: CPT | Mod: PBBFAC | Performed by: NURSE PRACTITIONER

## 2025-01-14 PROCEDURE — 99999 PR PBB SHADOW E&M-EST. PATIENT-LVL III: CPT | Mod: PBBFAC,,, | Performed by: NURSE PRACTITIONER

## 2025-01-14 NOTE — PROGRESS NOTES
"Neurology  Established Patient   SUBJECTIVE:    Patient ID: Amalia Rubalcava , 79 y.o.    Past Medical History:   Diagnosis Date    Hypertension     Mixed hyperlipidemia     Multiple myeloma        Past Surgical History:   Procedure Laterality Date    BLADDER SUSPENSION      HYSTERECTOMY         Review of patient's allergies indicates:  No Known Allergies    Chief Complaint:  Dementia f/u    History of Present Illness:     Here for dementia f/u; accompanied by daughter and DIL  Memory has worsened.   Forgets names, places, repeats herself.     2 wks ago, started with R drop foot.  DIL notices she was dragging foot, and pt said "Oh yeah, that's been going on for about a week"  Also ran into door and hit R head  Went to ER at [facility] last Wednesday or Thursday, was told all clear.     Lives alone. DIL lives next door "on the same property"    Drives     Does all ADL's herself.    Has multiple myeloma    Review of Systems - as per HPI, otherwise a balanced 10 systems review is negative.    Current Medications:  Current Outpatient Medications   Medication Instructions    amLODIPine (NORVASC) 10 mg, Every morning    ascorbic acid, vitamin C, (VITAMIN C) 500 MG tablet 1 tablet, Every morning    aspirin (ECOTRIN) 81 MG EC tablet 1 tablet, Every morning    atorvastatin (LIPITOR) 80 mg, Nightly    azithromycin (ZITHROMAX) 500 mg, Oral, Daily    busPIRone (BUSPAR) 5 mg, 2 times daily    cholecalciferol, vitamin D3, 125 mcg (5,000 unit) capsule 1 capsule, Every morning    dexAMETHasone (DECADRON) 4 MG Tab SMARTSIG:10 Tablet(s) By Mouth Once a Week    dexbrompheniramine-phenylep-DM 2-10-20 mg Tab 1 tablet, Every 6 hours PRN    lenalidomide 25 mg    prochlorperazine (COMPAZINE) 5 mg, Every 6 hours PRN    spironolactone (ALDACTONE) 25 mg    zinc gluconate 50 mg tablet 1 tablet, Every morning       OBJECTIVE:    Vitals:  /82 (BP Location: Left arm, Patient Position: Sitting)   Ht 5' 3" (1.6 m)   Wt 47.6 kg (105 " lb)   BMI 18.60 kg/m²      Physical Exam:  Constitutional  she appears well-developed and well-nourished. she is well groomed.    Accompanied by - daughter (Arleen) and DIL (Mari)  Appearance - well appearing, no apparent distress, unassisted  Skin- no obvious lesions noted; R ankle elastic sleeve    Neurologic  Cortical function - The patient is alert, attentive, and MMSE 26/30  Speech - clear   Cranial nerves:  CN 3, 4, 6 EOMs - normal. No ptosis or lateral gaze deviation  CN 7 - no face asymmetry; normal eye closure and smile  CN 8 - hearing is grossly normal  Motor - R PF 5/5, R DF 1/5; generalized wkns  Gait - unassisted; posture upright. gait is cautious. R foot drop       ASSESSMENT /PLAN:    Problem List Items Addressed This Visit          Neuro    Dementia without behavioral disturbance, psychotic disturbance, mood disturbance, or anxiety - Primary      - Safety concerns reviewed (home safety, polypharmacy and medication management, wandering, fall prevention, kitchen safety, support network, etc.)    - Will send to [facility] [employer] for drivers eval    - Advised she get a Medic Alert Button    - Legal and financial matters discussed;  Advise the family to review legal and financial matters, such as power of , advance directives, and mendez. It is important to ensure that necessary legal documents are in place while the patient can still participate in decision-making.     - Need POA, both medical and financial      Foot drop, right foot    Differentials include:  Entrapment neuropathy of perineal nerve (due to crossing legs)    - If related to EN, should resolve with time if she avoids leg crossing   MM diagnosis or treatment   Lumbar spine etiology       Orders Placed This Encounter    Ambulatory referral/consult to Physical/Occupational Therapy     Questions and concerns were sought and answered to the patient's stated verbal satisfaction.    The patient verbalizes understanding and agreement with the above  stated treatment plan.   Items discussed include acute and/or chronic neurological, sleep, or other issues and their attendant differential diagnoses.  Potential for additional testing, treatment options, and prognosis also discussed.    ___single dx _*__multiple issues/ diagnoses  ___ low __ mod _*__ high complexity of data  ___low __mod _*__ high risks     Medical Decision Making (MDM) used for CPT choice:  ___low  ___moderate  __*__high        PURNIMA Guzman  [facility]  [phone]

## 2025-03-06 ENCOUNTER — TELEPHONE (OUTPATIENT)
Dept: NEUROLOGY | Facility: CLINIC | Age: 80
End: 2025-03-06
Payer: MEDICARE

## 2025-03-06 NOTE — TELEPHONE ENCOUNTER
Received [facility] driving evaluation. Based on the assessment, pt should retire from driving (see scanned evaluation for additional details)    Spoke with Arleen (daughter) to inform her    Also Spoke with patient to inform her of results. She reports she has not been driving and both daughter and DIL have and are willing to drive her wherever she needs to go.       [Negative] : Genitourinary

## 2025-07-10 DIAGNOSIS — Z13.820 ENCOUNTER FOR OSTEOPOROSIS SCREENING IN ASYMPTOMATIC POSTMENOPAUSAL PATIENT: Primary | ICD-10-CM

## 2025-07-10 DIAGNOSIS — Z78.0 ENCOUNTER FOR OSTEOPOROSIS SCREENING IN ASYMPTOMATIC POSTMENOPAUSAL PATIENT: Primary | ICD-10-CM

## 2025-08-20 ENCOUNTER — HOSPITAL ENCOUNTER (OUTPATIENT)
Dept: RADIOLOGY | Facility: HOSPITAL | Age: 80
Discharge: HOME | End: 2025-08-20
Payer: MEDICARE

## 2025-08-20 DIAGNOSIS — M54.50 LOW BACK PAIN: Primary | ICD-10-CM

## 2025-08-20 DIAGNOSIS — M25.552 ACUTE HIP PAIN, LEFT: Primary | ICD-10-CM

## 2025-08-20 DIAGNOSIS — M54.50 LOW BACK PAIN: ICD-10-CM

## 2025-08-20 DIAGNOSIS — M25.552 ACUTE HIP PAIN, LEFT: ICD-10-CM

## 2025-08-20 PROCEDURE — 73502 X-RAY EXAM HIP UNI 2-3 VIEWS: CPT | Mod: TC,LT

## 2025-08-20 PROCEDURE — 72100 X-RAY EXAM L-S SPINE 2/3 VWS: CPT | Mod: TC
